# Patient Record
(demographics unavailable — no encounter records)

---

## 2025-02-14 NOTE — HISTORY OF PRESENT ILLNESS
[FreeTextEntry1] : ANAY is a 56 year F w/MHx of APC, HLD, Asthma, KAMLESH, Obesity, Anxiety who presents for follow up. Last OV 8/1/2024. Did have ongoing joint pain with some improvement at last visit, seeing rheumatology Dr. Chahal. Denies chest pain, palpitations, diaphoresis, vision changes, HA, dizziness, syncope, cough, wheezing, SOB/PAZ, edema, fatigue, fever, chills, infection/UTI SXS.  pt with ocassional neck pain

## 2025-02-14 NOTE — PHYSICAL EXAM

## 2025-03-13 NOTE — HISTORY OF PRESENT ILLNESS
[FreeTextEntry1] : Ms. ANAY ROTHMAN is a 55 year old female who has a past medical history significant for Obesity (On semaglutide currently off - lost 30 lbs) and HLD who presents for a telehealth follow up evaluation. Patient feels generally well today. Denies SOB, chest pain, palpitations, dizziness, and syncope.   ================ ================ 5/2024 Was on Wegovy 1.0 through off-site pay out of pocket Wegovy 1mg x 4 doses no side effects Recently had her holiday where there was a lot of eating, doesn't have the appetite suppression. Didn't lose weight but didn't gain. Reported more appetite suppression when on the offsite out of pocket medication Recent weight of 182 lbs ================= 7/2024 Ozempic 2mg x 4 doses Appetite suppression Some snacking at night, wakes up not feeling well, knows to avoif certain foods ================== 8/2024 Obesity/HLD Ozempic 2 mg x 3 months - Reports gaining weight - Feels she has hit a plateau - Will try for Mounjaro ================== 3/2025 On Ozempic 2.0mg -States that her weight has plateaued -Weight today 176 (prior 180).  Has maintained this range for months -Does not feel satiated but tries to not over eat -Diagnosed with KAMLESH.  Dr Marin would like transition to Mounjaro.

## 2025-03-13 NOTE — DISCUSSION/SUMMARY
[FreeTextEntry1] : Currently on Ozempic 2.0mg Has hit plateau ranging from 175-180 pounds although maxed out on Ozempic. Diagnosed with KAMLESH. Continue with current regimen while we attempt to obtain coverage for Zepbound 5.0mg.   If not covered and willing to pay out of pocket will sent to [facility] [employer]. Will follow up 4 weeks after initiation for follow up visit. Will need follow up appt with Dr. Mars to discuss Hyperlipidemia medications                       Cholesterol management - Assessed - Impression is active; changes as per above - Discussed diet and exercise at length - Any lifestyle/medication changes as per above Risk factors for cardiomyopathy - Assessed - Impression is stable - Reviewed records from PCP BP - Assessed - Impression is active Cardiac Health optimization - Discussed diet and exercise at length - Discussed importance of monitoring and re-assessment of cardiac health on further visits

## 2025-03-13 NOTE — DISCUSSION/SUMMARY
[FreeTextEntry1] : Currently on Ozempic 2.0mg Has hit plateau ranging from 175-180 pounds although maxed out on Ozempic. Diagnosed with KAMLESH. Continue with current regimen while we attempt to obtain coverage for Zepbound 5.0mg.   If not covered and willing to pay out of pocket will sent to [facility] [employer]. Will follow up 4 weeks after initiation for follow up visit. Will need follow up appt with Dr. Mars to discuss Hyperlipidemia medications

## 2025-05-01 NOTE — HISTORY OF PRESENT ILLNESS
[FreeTextEntry1] : Ms. ANAY ROTHMAN is a 55 year old female who has a past medical history significant for Obesity (On semaglutide currently off - lost 30 lbs) and HLD who presents for a telehealth follow up evaluation. Patient feels generally well today. Denies SOB, chest pain, palpitations, dizziness, and syncope.   ================ ================ 5/2024 Was on Wegovy 1.0 through off-site pay out of pocket Wegovy 1mg x 4 doses no side effects Recently had her holiday where there was a lot of eating, doesn't have the appetite suppression. Didn't lose weight but didn't gain. Reported more appetite suppression when on the offsite out of pocket medication Recent weight of 182 lbs ================= 7/2024 Ozempic 2mg x 4 doses Appetite suppression Some snacking at night, wakes up not feeling well, knows to avoif certain foods ================== 8/2024 Obesity/HLD Ozempic 2 mg x 3 months - Reports gaining weight - Feels she has hit a plateau - Will try for Mounjaro ================== 3/2025 On Ozempic 2.0mg -States that her weight has plateaued -Weight today 176 (prior 180).  Has maintained this range for months -Does not feel satiated but tries to not over eat -Diagnosed with KAMLESH.  Dr Marin would like transition to Mounjaro. ---------------------------------------------------------------------------- ---------------------------------------------------------------------------- 4-2025 CC: Heart Issues - Patient reports no chest pain, no localization to the sternum, no radiation to the neck/jaw, no alleviating nor worsening precipitants to CP, no assoc symptoms to CP no alleviating nor worsening precipitants to CP, no assoc symptoms to CP - Patient notes no associated SOB/Palps/Leg swelling - Reports No associated F/C/N/V/Headaches - Reports Normal Exercise Tolerance - Reports no medication changes - Reports normal mood/quality of life - Reports no associated midnight awakenings from cP - Reports no diet changes - Reports no associated body aches- Reports no recent colds/viruses- Recent labs/imaging reviewed- Relevant Family history reviewed Mother/Father - CVRisk Assessment for 10 Year ACC/AHA Pooled Risk Cohort Equation places this person at < 7.5% Risk of ASCVD Incr Zepb to 5.0 Change Statin to atha Romario

## 2025-05-01 NOTE — DISCUSSION/SUMMARY
[FreeTextEntry1] : Keo, 55 year old with a past medical history of Obesity (ON Semaglutide Currently from Off - Lost 30 lbs ), HL =============== =============== Obesity  Increase Zepb to 7.5  Type IIA HeFH Statin Intolerance to Atorva/Rosuva for > 8 wks tried twice Start Repatha   French, as always, kind thanks for the referral.  Aakash Mars MD [facility] FELICITAS TAMEZ Director, Preventive Cardiology & Lipidology [facility]                       Cholesterol management - Assessed - Impression is active; changes as per above - Discussed diet and exercise at length - Any lifestyle/medication changes as per above Risk factors for cardiomyopathy - Assessed - Impression is stable - Reviewed records from PCP BP - Assessed - Impression is active Cardiac Health optimization - Discussed diet and exercise at length - Discussed importance of monitoring and re-assessment of cardiac health on further visits

## 2025-06-11 NOTE — HEALTH RISK ASSESSMENT
[0] : 2) Feeling down, depressed, or hopeless: Not at all (0) [PHQ-2 Negative - No further assessment needed] : PHQ-2 Negative - No further assessment needed [Never] : Never [[id number]] : 0

## 2025-06-11 NOTE — PHYSICAL EXAM
[Well Nourished] : well nourished [No Acute Distress] : no acute distress [No Respiratory Distress] : no respiratory distress  [Well-Appearing] : well-appearing [No Accessory Muscle Use] : no accessory muscle use [Speech Grossly Normal] : speech grossly normal [Memory Grossly Normal] : memory grossly normal [Alert and Oriented x3] : oriented to person, place, and time

## 2025-06-11 NOTE — HISTORY OF PRESENT ILLNESS
[Medical Office: ([facility])___] : at the medical office located in  [Home] : at home, [unfilled] , at the time of the visit. [Telehealth (audio & video)] : This visit was provided via telehealth using real-time 2-way audio visual technology. [Verbal consent obtained from patient] : the patient, [unfilled] [FreeTextEntry1] : f/u [de-identified] : Ms. ROTHMAN is a 56-year-old female with PMHx of APC, HLD, Asthma, KAMLESH, Obesity, Anxiety, presenting today for a telehealth follow up. Pt donated a kidney 2 weeks ago. She c/o fatigue, low vitality, anxious, mood swings, and depressed feeling. Denies SI.HI  Pt used to be on fluoxetine 10mg about 1 year ago but stopped it as was doing well. Wants to be restarted on it. Besides minimal pain from the incision area, she denies any other pain. She has not yet checked her renal function, and due for follow up with the surgeon in 2 days.  Denies chest pain, sob, valenzuela, dizziness, diaphoresis, palpitations, LE swelling, orthopnea, syncope, n/v, headache.

## 2025-06-11 NOTE — HEALTH RISK ASSESSMENT
[0] : 2) Feeling down, depressed, or hopeless: Not at all (0) [PHQ-2 Negative - No further assessment needed] : PHQ-2 Negative - No further assessment needed [Never] : Never [[id number]] : 0

## 2025-06-11 NOTE — HISTORY OF PRESENT ILLNESS
[Medical Office: ([facility])___] : at the medical office located in  [Home] : at home, [unfilled] , at the time of the visit. [Telehealth (audio & video)] : This visit was provided via telehealth using real-time 2-way audio visual technology. [Verbal consent obtained from patient] : the patient, [unfilled] [FreeTextEntry1] : f/u [de-identified] : Ms. ROTHMAN is a 56-year-old female with PMHx of APC, HLD, Asthma, KAMLESH, Obesity, Anxiety, presenting today for a telehealth follow up. Pt donated a kidney 2 weeks ago. She c/o fatigue, low vitality, anxious, mood swings, and depressed feeling. Denies SI.HI  Pt used to be on fluoxetine 10mg about 1 year ago but stopped it as was doing well. Wants to be restarted on it. Besides minimal pain from the incision area, she denies any other pain. She has not yet checked her renal function, and due for follow up with the surgeon in 2 days.  Denies chest pain, sob, valenzuela, dizziness, diaphoresis, palpitations, LE swelling, orthopnea, syncope, n/v, headache.

## 2025-06-11 NOTE — PHYSICAL EXAM
[Well Nourished] : well nourished [No Acute Distress] : no acute distress [No Respiratory Distress] : no respiratory distress  [Well-Appearing] : well-appearing [No Accessory Muscle Use] : no accessory muscle use [Memory Grossly Normal] : memory grossly normal [Speech Grossly Normal] : speech grossly normal [Alert and Oriented x3] : oriented to person, place, and time

## 2025-06-11 NOTE — ADDENDUM
[FreeTextEntry1] : This note was written by Gerald Candelaria on 06/11/2025 acting as medical scribe for Dr. Bailey Shafer. I, Dr. Bailey Shafer, have read and attest that all the information, medical decision making and discharge instructions within are true and accurate.

## 2025-06-15 NOTE — HISTORY OF PRESENT ILLNESS
[FreeTextEntry1] : 2-day f/u [de-identified] : Ms. ROTHMAN is a 56 year-old female with PMHx of APC, HLD, Asthma, KAMLESH, Obesity, Anxiety, presenting today for a follow up. Pt donated left kidney about 2 weeks ago with Dr. Gutierrez. Since then, she has feeling fatigue and mood down ovreall, no si/hi. We did a telehealth 2 days ago and I sent her fluoxetine 10 mg which she used to be on, but did not restart it. She reports feeling better today and unsure if she will take the fluoxetine. She spoke with surgeon today on telehealth Dr. Gutierrez who reportedly explained that feeling fatigue and  "down"is expected and may last up to 3 weeks.  Labworks on 6/12/25 showed Elevated platelet 744. Cr. 1.25 elevated from baseline 0.7/0.8. K mild elevation 5.4. and AlkPhos elevated 126. Denies chest pain, sob, valenzuela, dizziness, diaphoresis, palpitations, LE swelling, orthopnea, syncope, n/v, headache.

## 2025-06-15 NOTE — PHYSICAL EXAM
[No Acute Distress] : no acute distress [Well Nourished] : well nourished [Well Developed] : well developed [Well-Appearing] : well-appearing [Normal Sclera/Conjunctiva] : normal sclera/conjunctiva [PERRL] : pupils equal round and reactive to light [EOMI] : extraocular movements intact [Normal Outer Ear/Nose] : the outer ears and nose were normal in appearance [Normal Oropharynx] : the oropharynx was normal [No JVD] : no jugular venous distention [No Lymphadenopathy] : no lymphadenopathy [Supple] : supple [Thyroid Normal, No Nodules] : the thyroid was normal and there were no nodules present [No Respiratory Distress] : no respiratory distress  [No Accessory Muscle Use] : no accessory muscle use [Clear to Auscultation] : lungs were clear to auscultation bilaterally [Normal Rate] : normal rate  [Regular Rhythm] : with a regular rhythm [Normal S1, S2] : normal S1 and S2 [No Murmur] : no murmur heard [No Carotid Bruits] : no carotid bruits [No Varicosities] : no varicosities [Pedal Pulses Present] : the pedal pulses are present [No Edema] : there was no peripheral edema [No Extremity Clubbing/Cyanosis] : no extremity clubbing/cyanosis [Soft] : abdomen soft [Non Tender] : non-tender [Non-distended] : non-distended [Normal Bowel Sounds] : normal bowel sounds [Normal Posterior Cervical Nodes] : no posterior cervical lymphadenopathy [Normal Anterior Cervical Nodes] : no anterior cervical lymphadenopathy [No CVA Tenderness] : no CVA  tenderness [No Spinal Tenderness] : no spinal tenderness [No Joint Swelling] : no joint swelling [Grossly Normal Strength/Tone] : grossly normal strength/tone [No Rash] : no rash [Coordination Grossly Intact] : coordination grossly intact [No Focal Deficits] : no focal deficits [Normal Gait] : normal gait [Normal Affect] : the affect was normal [Normal Insight/Judgement] : insight and judgment were intact [Alert and Oriented x3] : oriented to person, place, and time [de-identified] : well healed periumbilical surgicla scar, no sign of infection

## 2025-06-15 NOTE — HISTORY OF PRESENT ILLNESS
[FreeTextEntry1] : 2-day f/u [de-identified] : Ms. ROTHMAN is a 56 year-old female with PMHx of APC, HLD, Asthma, KAMLESH, Obesity, Anxiety, presenting today for a follow up. Pt donated left kidney about 2 weeks ago with Dr. Gutierrez. Since then, she has feeling fatigue and mood down ovreall, no si/hi. We did a telehealth 2 days ago and I sent her fluoxetine 10 mg which she used to be on, but did not restart it. She reports feeling better today and unsure if she will take the fluoxetine. She spoke with surgeon today on telehealth Dr. Gutierrez who reportedly explained that feeling fatigue and  "down"is expected and may last up to 3 weeks.  Labworks on 6/12/25 showed Elevated platelet 744. Cr. 1.25 elevated from baseline 0.7/0.8. K mild elevation 5.4. and AlkPhos elevated 126. Denies chest pain, sob, valenzuela, dizziness, diaphoresis, palpitations, LE swelling, orthopnea, syncope, n/v, headache.

## 2025-06-15 NOTE — ADDENDUM
[FreeTextEntry1] : This note was written by Gerald Candelaria on 06/13/2025 acting as medical scribe for Dr. Bailey Shafer. I, Dr. Bailey Shafer, have read and attest that all the information, medical decision making and discharge instructions within are true and accurate.

## 2025-06-15 NOTE — PHYSICAL EXAM
[No Acute Distress] : no acute distress [Well Nourished] : well nourished [Well Developed] : well developed [Well-Appearing] : well-appearing [Normal Sclera/Conjunctiva] : normal sclera/conjunctiva [PERRL] : pupils equal round and reactive to light [EOMI] : extraocular movements intact [Normal Outer Ear/Nose] : the outer ears and nose were normal in appearance [Normal Oropharynx] : the oropharynx was normal [No JVD] : no jugular venous distention [No Lymphadenopathy] : no lymphadenopathy [Supple] : supple [Thyroid Normal, No Nodules] : the thyroid was normal and there were no nodules present [No Respiratory Distress] : no respiratory distress  [No Accessory Muscle Use] : no accessory muscle use [Clear to Auscultation] : lungs were clear to auscultation bilaterally [Normal Rate] : normal rate  [Regular Rhythm] : with a regular rhythm [No Murmur] : no murmur heard [Normal S1, S2] : normal S1 and S2 [No Carotid Bruits] : no carotid bruits [No Varicosities] : no varicosities [Pedal Pulses Present] : the pedal pulses are present [No Edema] : there was no peripheral edema [No Extremity Clubbing/Cyanosis] : no extremity clubbing/cyanosis [Soft] : abdomen soft [Non Tender] : non-tender [Non-distended] : non-distended [Normal Bowel Sounds] : normal bowel sounds [Normal Posterior Cervical Nodes] : no posterior cervical lymphadenopathy [Normal Anterior Cervical Nodes] : no anterior cervical lymphadenopathy [No CVA Tenderness] : no CVA  tenderness [No Spinal Tenderness] : no spinal tenderness [No Joint Swelling] : no joint swelling [Grossly Normal Strength/Tone] : grossly normal strength/tone [No Rash] : no rash [Coordination Grossly Intact] : coordination grossly intact [No Focal Deficits] : no focal deficits [Normal Gait] : normal gait [Normal Affect] : the affect was normal [Normal Insight/Judgement] : insight and judgment were intact [Alert and Oriented x3] : oriented to person, place, and time [de-identified] : well healed periumbilical surgicla scar, no sign of infection

## 2025-06-20 NOTE — HISTORY OF PRESENT ILLNESS
[FreeTextEntry1] : ANAY is a 56 year F w/MHx of HLD, Asthma, KAMLESH, Obesity, Anxiety who presents for follow up. Last OV 2/14/2025. Did have c/o neck pain and ongoing joint pain of fingers/hands. Notes resolution. Did donate kidney 5/2025. Did see Dr. Shafer w/ c/o fatigue, was started on Prozac. Denies chest pain, palpitations, diaphoresis, vision changes, HA, dizziness, syncope, cough, wheezing, SOB/PAZ, edema, fatigue, fever, chills, infection/UTI SXS.